# Patient Record
Sex: MALE | Race: WHITE | NOT HISPANIC OR LATINO | Employment: FULL TIME | URBAN - METROPOLITAN AREA
[De-identification: names, ages, dates, MRNs, and addresses within clinical notes are randomized per-mention and may not be internally consistent; named-entity substitution may affect disease eponyms.]

---

## 2024-04-10 ENCOUNTER — OFFICE VISIT (OUTPATIENT)
Dept: PRIMARY CARE | Facility: CLINIC | Age: 54
End: 2024-04-10
Payer: COMMERCIAL

## 2024-04-10 VITALS
BODY MASS INDEX: 28.59 KG/M2 | WEIGHT: 204.2 LBS | DIASTOLIC BLOOD PRESSURE: 92 MMHG | TEMPERATURE: 96.3 F | OXYGEN SATURATION: 98 % | SYSTOLIC BLOOD PRESSURE: 142 MMHG | HEART RATE: 87 BPM | HEIGHT: 71 IN

## 2024-04-10 DIAGNOSIS — M25.651 HIP JOINT STIFFNESS, BILATERAL: Primary | ICD-10-CM

## 2024-04-10 DIAGNOSIS — M25.652 HIP JOINT STIFFNESS, BILATERAL: Primary | ICD-10-CM

## 2024-04-10 PROCEDURE — 1036F TOBACCO NON-USER: CPT | Performed by: FAMILY MEDICINE

## 2024-04-10 PROCEDURE — 99214 OFFICE O/P EST MOD 30 MIN: CPT | Performed by: FAMILY MEDICINE

## 2024-04-10 RX ORDER — CYCLOBENZAPRINE HCL 5 MG
5 TABLET ORAL NIGHTLY PRN
Qty: 30 TABLET | Refills: 0 | Status: SHIPPED | OUTPATIENT
Start: 2024-04-10 | End: 2024-05-02 | Stop reason: WASHOUT

## 2024-04-10 RX ORDER — ATORVASTATIN CALCIUM 40 MG/1
40 TABLET, FILM COATED ORAL DAILY
COMMUNITY

## 2024-04-10 RX ORDER — FAMOTIDINE 20 MG/1
20 TABLET, FILM COATED ORAL DAILY
COMMUNITY

## 2024-04-10 RX ORDER — NABUMETONE 750 MG/1
750 TABLET, FILM COATED ORAL 2 TIMES DAILY
Qty: 60 TABLET | Refills: 1 | Status: SHIPPED | OUTPATIENT
Start: 2024-04-10 | End: 2024-05-02 | Stop reason: WASHOUT

## 2024-04-10 ASSESSMENT — LIFESTYLE VARIABLES
SKIP TO QUESTIONS 9-10: 1
HOW OFTEN DO YOU HAVE SIX OR MORE DRINKS ON ONE OCCASION: NEVER
HOW MANY STANDARD DRINKS CONTAINING ALCOHOL DO YOU HAVE ON A TYPICAL DAY: PATIENT DOES NOT DRINK
HOW OFTEN DO YOU HAVE A DRINK CONTAINING ALCOHOL: NEVER
AUDIT-C TOTAL SCORE: 0

## 2024-04-10 ASSESSMENT — PATIENT HEALTH QUESTIONNAIRE - PHQ9
1. LITTLE INTEREST OR PLEASURE IN DOING THINGS: NOT AT ALL
SUM OF ALL RESPONSES TO PHQ9 QUESTIONS 1 AND 2: 0
2. FEELING DOWN, DEPRESSED OR HOPELESS: NOT AT ALL

## 2024-04-10 ASSESSMENT — PAIN SCALES - GENERAL: PAINLEVEL: 8

## 2024-04-10 NOTE — PROGRESS NOTES
"History Of Present Illness  Anthony Espinoza is a 54 y.o. male presenting for Hip Pain (Pt c/o of hip/groin pain x 1.5 days. Pt states he can't walk without pain. Pt also states he has chronic back pain but it hasn't moved to his hips since yesterday. Pt states he didn't do anything to injury himself. )  .    HPI     A week and a half ago, back was bothering him. Low back was tight and sore.  No radiation. Went on vacation 1 week ago and was steady.   Went to work yesterday afternoon and hips are very stiff and sore.   Took Aleve and salonpas (lidocaine patch) with no relief.   Similar episode in 2020 and xray of hip was negative. Relieved with nabumetone.  No weakness, saddle paresthesia.   Sitting still relieves the pain.    No constipation, abd pain, nausea      Past Medical History  There are no problems to display for this patient.       Medications  Current Outpatient Medications on File Prior to Visit   Medication Sig    atorvastatin (Lipitor) 40 mg tablet Take 1 tablet (40 mg) by mouth once daily.    famotidine (Pepcid) 20 mg tablet Take 1 tablet (20 mg) by mouth once daily.     No current facility-administered medications on file prior to visit.        Surgical History  He has a past surgical history that includes Other surgical history (02/05/2018) and Appendectomy (02/05/2018).     Social History  He reports that he has never smoked. He has never been exposed to tobacco smoke. He has never used smokeless tobacco. He reports that he does not currently use alcohol. He reports that he does not use drugs.    Family History  No family history on file.     Allergies  Patient has no known allergies.    Immunizations  Immunization History   Administered Date(s) Administered    Moderna SARS-CoV-2 Vaccination 03/25/2021, 04/22/2021, 12/04/2021        ROS  Negative, except as discussed in HPI     Vitals  BP (!) 142/92   Pulse 87   Temp 35.7 °C (96.3 °F)   Ht 1.803 m (5' 11\")   Wt 92.6 kg (204 lb 3.2 oz)   SpO2 98%  "  BMI 28.48 kg/m²      Physical Exam  Vitals reviewed.   Constitutional:       Appearance: Normal appearance.   Abdominal:      General: Abdomen is flat.      Palpations: Abdomen is soft.      Tenderness: There is no abdominal tenderness.   Musculoskeletal:         General: No tenderness. Normal range of motion.   Neurological:      General: No focal deficit present.      Mental Status: He is alert.      Cranial Nerves: No cranial nerve deficit.         Relevant Results  Lab Results   Component Value Date    WBC 7.2 06/28/2023    WBC 7.2 05/23/2022    HGB 15.9 06/28/2023    HGB 15.6 05/23/2022    HCT 49.6 06/28/2023    HCT 47.8 05/23/2022    .6 (H) 06/28/2023    MCV 97.4 05/23/2022     06/28/2023     05/23/2022     Lab Results   Component Value Date     06/28/2023     05/23/2022    K 4.9 06/28/2023    K 4.5 05/23/2022     06/28/2023     05/23/2022    CO2 22 (L) 06/28/2023    CO2 25 05/23/2022    BUN 15 06/28/2023    BUN 13 05/23/2022    CREATININE 0.9 06/28/2023    CREATININE 0.8 05/23/2022    CALCIUM 9.4 06/28/2023    CALCIUM 9.3 05/23/2022    PROT 7.2 06/28/2023    PROT 7.5 05/23/2022    BILITOT 0.3 06/28/2023    BILITOT 0.4 05/23/2022    ALKPHOS 93 06/28/2023    ALKPHOS 108 05/23/2022    ALT 27 06/28/2023    ALT 25 05/23/2022    AST 24 06/28/2023    AST 25 05/23/2022    GLUCOSE 99 06/28/2023    GLUCOSE 101 (H) 05/23/2022     Lab Results   Component Value Date    HGBA1C 5.5 05/23/2022     Lab Results   Component Value Date    TSH 3.49 02/06/2018    FREET4 1.21 02/06/2018      Lab Results   Component Value Date    CHOL 131 (L) 06/28/2023    TRIG 95 06/28/2023    HDL 52 06/28/2023           Assessment/Plan   Anthony was seen today for hip pain.  Diagnoses and all orders for this visit:  Hip joint stiffness, bilateral (Primary)  -     currently not in pain until he is active. Declines toradol injection. Trial of NSAID and muscle relaxer. If no improvement, call back for  medrol dose pack.  -      nabumetone (Relafen) 750 mg tablet; Take 1 tablet (750 mg) by mouth 2 times a day.  -     cyclobenzaprine (Flexeril) 5 mg tablet; Take 1 tablet (5 mg) by mouth as needed at bedtime for muscle spasms.       There are no discontinued medications.     Counseling:   Medication education:   -Education:  The patient is counseled regarding potential side-effects of any and all new medications  -Understanding:  Patient expressed understanding of information discussed today  -Adherence:  No barriers to adherence identified    Final treatment plan is a result of shared decision making with patient.         Jitendra Aopnte MD

## 2024-04-10 NOTE — LETTER
April 10, 2024     Patient: Anthony Espinoza   YOB: 1970   Date of Visit: 4/10/2024       To Whom It May Concern:    Anthony Espinoza was seen in my clinic on 4/10/2024 at 2:15 pm. Please excuse Anthony for his absence from work on this day to make the appointment. He is cleared to return to work on Friday 4/12/2024 without restrictions.    If you have any questions or concerns, please don't hesitate to call.         Sincerely,         Jitendra Aponte MD        CC: No Recipients

## 2024-04-12 ENCOUNTER — PATIENT MESSAGE (OUTPATIENT)
Dept: PRIMARY CARE | Facility: CLINIC | Age: 54
End: 2024-04-12
Payer: COMMERCIAL

## 2024-04-17 ENCOUNTER — TELEPHONE (OUTPATIENT)
Dept: PRIMARY CARE | Facility: CLINIC | Age: 54
End: 2024-04-17
Payer: COMMERCIAL

## 2024-04-17 NOTE — TELEPHONE ENCOUNTER
PT CALLING TO LET MD KNOW THAT [facility] WILL BE CONTACTING OFFICE REGARDING SHORT TERM DISABILITY

## 2024-04-22 ENCOUNTER — TELEPHONE (OUTPATIENT)
Dept: PRIMARY CARE | Facility: CLINIC | Age: 54
End: 2024-04-22
Payer: COMMERCIAL

## 2024-05-02 ENCOUNTER — OFFICE VISIT (OUTPATIENT)
Dept: PRIMARY CARE | Facility: CLINIC | Age: 54
End: 2024-05-02
Payer: COMMERCIAL

## 2024-05-02 VITALS
TEMPERATURE: 98.2 F | HEIGHT: 71 IN | OXYGEN SATURATION: 97 % | WEIGHT: 204.8 LBS | SYSTOLIC BLOOD PRESSURE: 142 MMHG | HEART RATE: 99 BPM | DIASTOLIC BLOOD PRESSURE: 90 MMHG | BODY MASS INDEX: 28.67 KG/M2

## 2024-05-02 DIAGNOSIS — M54.50 ACUTE BILATERAL LOW BACK PAIN WITHOUT SCIATICA: Primary | ICD-10-CM

## 2024-05-02 PROCEDURE — 99214 OFFICE O/P EST MOD 30 MIN: CPT | Performed by: FAMILY MEDICINE

## 2024-05-02 PROCEDURE — 1036F TOBACCO NON-USER: CPT | Performed by: FAMILY MEDICINE

## 2024-05-02 RX ORDER — IBUPROFEN 800 MG/1
800 TABLET ORAL EVERY 8 HOURS PRN
Qty: 30 TABLET | Refills: 0 | Status: SHIPPED | OUTPATIENT
Start: 2024-05-02

## 2024-05-02 RX ORDER — METHYLPREDNISOLONE 4 MG/1
TABLET ORAL
Qty: 1 EACH | Refills: 0 | Status: SHIPPED | OUTPATIENT
Start: 2024-05-02

## 2024-05-02 ASSESSMENT — PATIENT HEALTH QUESTIONNAIRE - PHQ9
1. LITTLE INTEREST OR PLEASURE IN DOING THINGS: NOT AT ALL
2. FEELING DOWN, DEPRESSED OR HOPELESS: NOT AT ALL
SUM OF ALL RESPONSES TO PHQ9 QUESTIONS 1 AND 2: 0

## 2024-05-02 ASSESSMENT — PAIN SCALES - GENERAL: PAINLEVEL: 5

## 2024-05-02 NOTE — PROGRESS NOTES
"History Of Present Illness  Anthony Espinoza is a 54 y.o. male presenting for Medication Problem (Pt says the cyclobenzaprine he's on is causing him some issues and thinks he should be on the medication, \"steroid pack\" instead that MD discussed with him because he is not seeing any progress. He said the medication has helped since his last visit 3 weeks ago with stiffness. Has papers with him that need to be faxed over. No further concerns. )  .    HPI     Here to follow-up hip and back pain.  It started around early April while on vacation.  Had a flareup of his back pain as well as hip and groin pain.  He was prescribed nabumetone and Flexeril which has significantly improved his hip pain.  He reports persistent low back pain.  He still taking the muscle relaxer every evening.    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      Past Medical History  There are no problems to display for this patient.       Medications  Current Outpatient Medications on File Prior to Visit   Medication Sig    atorvastatin (Lipitor) 40 mg tablet Take 1 tablet (40 mg) by mouth once daily.    famotidine (Pepcid) 20 mg tablet Take 1 tablet (20 mg) by mouth once daily.    [DISCONTINUED] cyclobenzaprine (Flexeril) 5 mg tablet Take 1 tablet (5 mg) by mouth as needed at bedtime for muscle spasms.    [DISCONTINUED] nabumetone (Relafen) 750 mg tablet Take 1 tablet (750 mg) by mouth 2 times a day.     No current facility-administered medications on file prior to visit.        Surgical History  He has a past surgical history that includes Other surgical history (02/05/2018) and Appendectomy (02/05/2018).     Social History  He reports that he has never smoked. He has never been exposed to tobacco smoke. He has never used smokeless tobacco. He reports that he does not currently use alcohol. He reports that he does not use drugs.    Family History  No family history on file.     Allergies  Patient has no known " "allergies.    Immunizations  Immunization History   Administered Date(s) Administered    Moderna SARS-CoV-2 Vaccination 03/25/2021, 04/22/2021, 12/04/2021        ROS  Negative, except as discussed in HPI     Vitals  /90   Pulse 99   Temp 36.8 °C (98.2 °F)   Ht 1.803 m (5' 11\")   Wt 92.9 kg (204 lb 12.8 oz)   SpO2 97%   BMI 28.56 kg/m²      Physical Exam  Vitals and nursing note reviewed.   Constitutional:       General: He is not in acute distress.     Appearance: He is not toxic-appearing.   Pulmonary:      Effort: Pulmonary effort is normal.   Neurological:      General: No focal deficit present.      Mental Status: He is alert.   Psychiatric:         Mood and Affect: Mood normal.         Behavior: Behavior normal.         Thought Content: Thought content normal.         Judgment: Judgment normal.         Relevant Results  Lab Results   Component Value Date    WBC 7.2 06/28/2023    WBC 7.2 05/23/2022    HGB 15.9 06/28/2023    HGB 15.6 05/23/2022    HCT 49.6 06/28/2023    HCT 47.8 05/23/2022    .6 (H) 06/28/2023    MCV 97.4 05/23/2022     06/28/2023     05/23/2022     Lab Results   Component Value Date     06/28/2023     05/23/2022    K 4.9 06/28/2023    K 4.5 05/23/2022     06/28/2023     05/23/2022    CO2 22 (L) 06/28/2023    CO2 25 05/23/2022    BUN 15 06/28/2023    BUN 13 05/23/2022    CREATININE 0.9 06/28/2023    CREATININE 0.8 05/23/2022    CALCIUM 9.4 06/28/2023    CALCIUM 9.3 05/23/2022    PROT 7.2 06/28/2023    PROT 7.5 05/23/2022    BILITOT 0.3 06/28/2023    BILITOT 0.4 05/23/2022    ALKPHOS 93 06/28/2023    ALKPHOS 108 05/23/2022    ALT 27 06/28/2023    ALT 25 05/23/2022    AST 24 06/28/2023    AST 25 05/23/2022    GLUCOSE 99 06/28/2023    GLUCOSE 101 (H) 05/23/2022     Lab Results   Component Value Date    HGBA1C 5.5 05/23/2022     Lab Results   Component Value Date    TSH 3.49 02/06/2018    FREET4 1.21 02/06/2018      Lab Results   Component " Value Date    CHOL 131 (L) 06/28/2023    TRIG 95 06/28/2023    HDL 52 06/28/2023           Assessment/Plan   Anthony was seen today for medication problem.  Diagnoses and all orders for this visit:  Acute bilateral low back pain without sciatica (Primary)  -     methylPREDNISolone (Medrol Dospak) 4 mg tablets; Follow schedule on package instructions  -     ibuprofen 800 mg tablet; Take 1 tablet (800 mg) by mouth every 8 hours if needed for moderate pain (4 - 6).     Disability and FMLA paperwork completed.    Counseling:   Medication education:   -Education:  The patient is counseled regarding potential side-effects of any and all new medications  -Understanding:  Patient expressed understanding of information discussed today  -Adherence:  No barriers to adherence identified    Final treatment plan is a result of shared decision making with patient.         Jitendra Aponte MD

## 2024-05-10 ENCOUNTER — TELEPHONE (OUTPATIENT)
Dept: PRIMARY CARE | Facility: CLINIC | Age: 54
End: 2024-05-10
Payer: COMMERCIAL

## 2024-07-03 ENCOUNTER — APPOINTMENT (OUTPATIENT)
Dept: PRIMARY CARE | Facility: CLINIC | Age: 54
End: 2024-07-03
Payer: COMMERCIAL

## 2024-07-05 DIAGNOSIS — Z76.0 MEDICATION REFILL: Primary | ICD-10-CM

## 2024-07-08 RX ORDER — ATORVASTATIN CALCIUM 40 MG/1
40 TABLET, FILM COATED ORAL DAILY
Qty: 90 TABLET | Refills: 3 | Status: SHIPPED | OUTPATIENT
Start: 2024-07-08

## 2024-07-08 RX ORDER — FAMOTIDINE 20 MG/1
20 TABLET, FILM COATED ORAL DAILY
Qty: 90 TABLET | Refills: 3 | Status: SHIPPED | OUTPATIENT
Start: 2024-07-08

## 2024-08-01 ENCOUNTER — OFFICE VISIT (OUTPATIENT)
Dept: PRIMARY CARE | Facility: CLINIC | Age: 54
End: 2024-08-01
Payer: COMMERCIAL

## 2024-08-01 VITALS
TEMPERATURE: 98 F | SYSTOLIC BLOOD PRESSURE: 154 MMHG | BODY MASS INDEX: 27.66 KG/M2 | OXYGEN SATURATION: 95 % | HEART RATE: 90 BPM | WEIGHT: 197.6 LBS | HEIGHT: 71 IN | DIASTOLIC BLOOD PRESSURE: 80 MMHG

## 2024-08-01 DIAGNOSIS — K21.9 CHRONIC GERD: ICD-10-CM

## 2024-08-01 DIAGNOSIS — M54.50 CHRONIC BILATERAL LOW BACK PAIN WITHOUT SCIATICA: ICD-10-CM

## 2024-08-01 DIAGNOSIS — H61.21 IMPACTED CERUMEN OF RIGHT EAR: ICD-10-CM

## 2024-08-01 DIAGNOSIS — G89.29 CHRONIC BILATERAL LOW BACK PAIN WITHOUT SCIATICA: ICD-10-CM

## 2024-08-01 DIAGNOSIS — Z12.5 SCREENING PSA (PROSTATE SPECIFIC ANTIGEN): ICD-10-CM

## 2024-08-01 DIAGNOSIS — Z00.00 ANNUAL PHYSICAL EXAM: Primary | ICD-10-CM

## 2024-08-01 DIAGNOSIS — E78.5 HYPERLIPIDEMIA, UNSPECIFIED HYPERLIPIDEMIA TYPE: ICD-10-CM

## 2024-08-01 RX ORDER — FAMOTIDINE 40 MG/1
40 TABLET, FILM COATED ORAL DAILY
Qty: 90 TABLET | Refills: 3 | Status: SHIPPED | OUTPATIENT
Start: 2024-08-01 | End: 2025-08-01

## 2024-08-01 ASSESSMENT — LIFESTYLE VARIABLES
HOW OFTEN DO YOU HAVE SIX OR MORE DRINKS ON ONE OCCASION: NEVER
SKIP TO QUESTIONS 9-10: 1
HOW MANY STANDARD DRINKS CONTAINING ALCOHOL DO YOU HAVE ON A TYPICAL DAY: 1 OR 2
AUDIT-C TOTAL SCORE: 1
HOW OFTEN DO YOU HAVE A DRINK CONTAINING ALCOHOL: MONTHLY OR LESS

## 2024-08-01 ASSESSMENT — PAIN SCALES - GENERAL: PAINLEVEL: 3

## 2024-08-01 NOTE — PROGRESS NOTES
History Of Present Illness  Anthony Espinoza is a 54 y.o. male presenting for Annual Exam (Yearly physical exam.)  .    HPI     Health maintenance: He declines tetanus booster today.  Last colonoscopy was 4/2/2019 and surveillance is not due again until 2029.  He had a cardiac calcium score in 2018 with a total score of 87.85.    He has hyperlipidemia controlled with atorvastatin.  No myalgias    He has chronic low back pain without sciatica.  Had a recent flareup but it is improving, however not yet to his baseline of no pain.  He is still working full-time as a [employer] .  He is considering chiropractic treatment.    Has GERD.  He takes famotidine 20 mg once daily, but is having breakthrough symptoms.    Past Medical History  Patient Active Problem List    Diagnosis Date Noted    Hyperlipidemia 08/01/2024        Medications  Current Outpatient Medications   Medication Sig Dispense Refill    atorvastatin (Lipitor) 40 mg tablet take 1 tablet by mouth once daily 90 tablet 3    ibuprofen 800 mg tablet Take 1 tablet (800 mg) by mouth every 8 hours if needed for moderate pain (4 - 6). 30 tablet 0    famotidine (Pepcid) 40 mg tablet Take 1 tablet (40 mg) by mouth once daily. 90 tablet 3     No current facility-administered medications for this visit.        Surgical History  He has a past surgical history that includes Other surgical history (02/05/2018) and Appendectomy (02/05/2018).     Social History  He reports that he has never smoked. He has never been exposed to tobacco smoke. He has never used smokeless tobacco. He reports current alcohol use. He reports that he does not use drugs.    Family History  No family history on file.     Allergies  Patient has no known allergies.    Immunizations  Immunization History   Administered Date(s) Administered    Moderna SARS-CoV-2 Vaccination 03/25/2021, 04/22/2021, 12/04/2021        ROS  Negative, except as discussed in HPI     Vitals  /80   Pulse 90   Temp  "36.7 °C (98 °F)   Ht 1.803 m (5' 11\")   Wt 89.6 kg (197 lb 9.6 oz)   SpO2 95%   BMI 27.56 kg/m²      Physical Exam  Vitals and nursing note reviewed.   Constitutional:       Appearance: Normal appearance.   HENT:      Head: Normocephalic.      Right Ear: There is impacted cerumen (After successful irrigation TM is normal).      Left Ear: Tympanic membrane normal.      Nose: Nose normal.      Mouth/Throat:      Mouth: Mucous membranes are moist.   Eyes:      Extraocular Movements: Extraocular movements intact.      Conjunctiva/sclera: Conjunctivae normal.      Pupils: Pupils are equal, round, and reactive to light.   Cardiovascular:      Rate and Rhythm: Normal rate and regular rhythm.      Heart sounds: Normal heart sounds.   Pulmonary:      Effort: Pulmonary effort is normal. No respiratory distress.      Breath sounds: Normal breath sounds.   Abdominal:      General: Abdomen is flat.      Palpations: Abdomen is soft.      Tenderness: There is no abdominal tenderness.   Musculoskeletal:      Cervical back: Neck supple.   Lymphadenopathy:      Cervical: No cervical adenopathy.   Skin:     General: Skin is warm and dry.      Findings: No rash.   Neurological:      General: No focal deficit present.      Mental Status: He is alert. Mental status is at baseline.      Coordination: Coordination normal.      Gait: Gait normal.      Deep Tendon Reflexes: Reflexes normal.   Psychiatric:         Mood and Affect: Mood normal.         Behavior: Behavior normal.         Relevant Results  Lab Results   Component Value Date    WBC 8.5 08/03/2024    WBC 7.2 06/28/2023    HGB 14.9 08/03/2024    HGB 15.9 06/28/2023    HCT 44.9 08/03/2024    HCT 49.6 06/28/2023    MCV 96 08/03/2024    .6 (H) 06/28/2023     08/03/2024     06/28/2023     Lab Results   Component Value Date     08/03/2024     06/28/2023    K 4.8 08/03/2024    K 4.9 06/28/2023     08/03/2024     06/28/2023    CO2 24 " 08/03/2024    CO2 22 (L) 06/28/2023    BUN 17 08/03/2024    BUN 15 06/28/2023    CREATININE 0.90 08/03/2024    CREATININE 0.9 06/28/2023    CALCIUM 9.3 08/03/2024    CALCIUM 9.4 06/28/2023    PROT 6.9 08/03/2024    PROT 7.2 06/28/2023    BILITOT 0.5 08/03/2024    BILITOT 0.3 06/28/2023    ALKPHOS 129 (H) 08/03/2024    ALKPHOS 93 06/28/2023    ALT 23 08/03/2024    ALT 27 06/28/2023    AST 22 08/03/2024    AST 24 06/28/2023    GLUCOSE 96 08/03/2024    GLUCOSE 99 06/28/2023     Lab Results   Component Value Date    HGBA1C 5.7 (H) 08/03/2024     Lab Results   Component Value Date    TSH 3.39 08/03/2024    FREET4 1.21 02/06/2018      Lab Results   Component Value Date    CHOL 110 (L) 08/03/2024    TRIG 59 08/03/2024    HDL 47.0 08/03/2024           Assessment/Plan   Anthony was seen today for annual exam.  Diagnoses and all orders for this visit:  Annual physical exam (Primary)  -     Comprehensive Metabolic Panel; Future  -     Lipid Panel; Future  -     TSH with reflex to Free T4 if abnormal; Future  -     CBC; Future  -     Hemoglobin A1C; Future  -     Prostate Specific Antigen; Future  Screening PSA (prostate specific antigen)  -     Prostate Specific Antigen; Future  Chronic bilateral low back pain without sciatica  -     Referral to Physical Therapy; Future  Hyperlipidemia, unspecified hyperlipidemia type  -     CT cardiac scoring wo IV contrast; Future  Chronic GERD  -     famotidine (Pepcid) 40 mg tablet; Take 1 tablet (40 mg) by mouth once daily.  Impacted cerumen of right ear  -     Ear Cerumen Removal; Future         Counseling:   Medication education:   -Education:  The patient is counseled regarding potential side-effects of any and all new medications  -Understanding:  Patient expressed understanding of information discussed today  -Adherence:  No barriers to adherence identified    Final treatment plan is a result of shared decision making with patient.         Jitendra Aponte MD

## 2024-08-01 NOTE — PATIENT INSTRUCTIONS
-Get labs done fasting  -Call to schedule CT cardiac score  -We increased famotidine from 20mg to 40mg daily for acid reflux

## 2024-08-03 ENCOUNTER — LAB (OUTPATIENT)
Dept: LAB | Facility: LAB | Age: 54
End: 2024-08-03
Payer: COMMERCIAL

## 2024-08-03 DIAGNOSIS — Z12.5 SCREENING PSA (PROSTATE SPECIFIC ANTIGEN): ICD-10-CM

## 2024-08-03 DIAGNOSIS — Z00.00 ANNUAL PHYSICAL EXAM: ICD-10-CM

## 2024-08-03 LAB
ALBUMIN SERPL-MCNC: 4.1 G/DL (ref 3.5–5)
ALP BLD-CCNC: 129 U/L (ref 35–125)
ALT SERPL-CCNC: 23 U/L (ref 5–40)
ANION GAP SERPL CALC-SCNC: 9 MMOL/L
AST SERPL-CCNC: 22 U/L (ref 5–40)
BILIRUB SERPL-MCNC: 0.5 MG/DL (ref 0.1–1.2)
BUN SERPL-MCNC: 17 MG/DL (ref 8–25)
CALCIUM SERPL-MCNC: 9.3 MG/DL (ref 8.5–10.4)
CHLORIDE SERPL-SCNC: 107 MMOL/L (ref 97–107)
CHOLEST SERPL-MCNC: 110 MG/DL (ref 133–200)
CHOLEST/HDLC SERPL: 2.3 {RATIO}
CO2 SERPL-SCNC: 24 MMOL/L (ref 24–31)
CREAT SERPL-MCNC: 0.9 MG/DL (ref 0.4–1.6)
EGFRCR SERPLBLD CKD-EPI 2021: >90 ML/MIN/1.73M*2
ERYTHROCYTE [DISTWIDTH] IN BLOOD BY AUTOMATED COUNT: 12 % (ref 11.5–14.5)
EST. AVERAGE GLUCOSE BLD GHB EST-MCNC: 117 MG/DL
GLUCOSE SERPL-MCNC: 96 MG/DL (ref 65–99)
HBA1C MFR BLD: 5.7 %
HCT VFR BLD AUTO: 44.9 % (ref 41–52)
HDLC SERPL-MCNC: 47 MG/DL
HGB BLD-MCNC: 14.9 G/DL (ref 13.5–17.5)
LDLC SERPL CALC-MCNC: 51 MG/DL (ref 65–130)
MCH RBC QN AUTO: 31.7 PG (ref 26–34)
MCHC RBC AUTO-ENTMCNC: 33.2 G/DL (ref 32–36)
MCV RBC AUTO: 96 FL (ref 80–100)
NRBC BLD-RTO: 0 /100 WBCS (ref 0–0)
PLATELET # BLD AUTO: 252 X10*3/UL (ref 150–450)
POTASSIUM SERPL-SCNC: 4.8 MMOL/L (ref 3.4–5.1)
PROT SERPL-MCNC: 6.9 G/DL (ref 5.9–7.9)
PSA SERPL-MCNC: 0.5 NG/ML
RBC # BLD AUTO: 4.7 X10*6/UL (ref 4.5–5.9)
SODIUM SERPL-SCNC: 140 MMOL/L (ref 133–145)
TRIGL SERPL-MCNC: 59 MG/DL (ref 40–150)
TSH SERPL DL<=0.05 MIU/L-ACNC: 3.39 MIU/L (ref 0.27–4.2)
WBC # BLD AUTO: 8.5 X10*3/UL (ref 4.4–11.3)

## 2024-08-03 PROCEDURE — 80061 LIPID PANEL: CPT

## 2024-08-03 PROCEDURE — 85027 COMPLETE CBC AUTOMATED: CPT

## 2024-08-03 PROCEDURE — 83036 HEMOGLOBIN GLYCOSYLATED A1C: CPT

## 2024-08-03 PROCEDURE — 84443 ASSAY THYROID STIM HORMONE: CPT

## 2024-08-03 PROCEDURE — 80053 COMPREHEN METABOLIC PANEL: CPT

## 2024-08-03 PROCEDURE — 84153 ASSAY OF PSA TOTAL: CPT

## 2024-08-03 PROCEDURE — 36415 COLL VENOUS BLD VENIPUNCTURE: CPT

## 2024-08-27 ENCOUNTER — EVALUATION (OUTPATIENT)
Dept: PHYSICAL THERAPY | Facility: CLINIC | Age: 54
End: 2024-08-27
Payer: COMMERCIAL

## 2024-08-27 DIAGNOSIS — G89.29 CHRONIC BILATERAL LOW BACK PAIN WITHOUT SCIATICA: ICD-10-CM

## 2024-08-27 DIAGNOSIS — M54.50 CHRONIC BILATERAL LOW BACK PAIN WITHOUT SCIATICA: ICD-10-CM

## 2024-08-27 PROCEDURE — 97161 PT EVAL LOW COMPLEX 20 MIN: CPT | Mod: GP | Performed by: PHYSICAL THERAPIST

## 2024-08-27 PROCEDURE — 97140 MANUAL THERAPY 1/> REGIONS: CPT | Mod: GP | Performed by: PHYSICAL THERAPIST

## 2024-08-27 ASSESSMENT — PAIN - FUNCTIONAL ASSESSMENT: PAIN_FUNCTIONAL_ASSESSMENT: 0-10

## 2024-08-27 ASSESSMENT — PAIN SCALES - GENERAL: PAINLEVEL_OUTOF10: 4

## 2024-08-27 NOTE — PROGRESS NOTES
Physical Therapy Evaluation and Treatment      Patient Name: Anthony Espinoza  MRN: [id number]  Today's Date: 8/27/2024  Time Calculation  Start Time: 0728  Stop Time: 0807  Time Calculation (min): 39 min  PT Therapeutic Procedures Time Entry  Manual Therapy Time Entry: 14  Low complexity due to patient's clinical presentation being stable and uncomplicated by any significant comorbidities that may affect rehab tolerance and progression.    Insurance:  Visit number: 1 of 7  Authorization info: NO AUTH / [place] VISITS / DEDUCT $100 - $0 met / 80% COVERAGE / OOP $1000 - $0 met   Insurance Type: Payor: [employer] / Plan: ANTHEM [employer] / Product Type: *No Product type* /     Current Problem:   1. Chronic bilateral low back pain without sciatica  Referral to Physical Therapy    Follow Up In Physical Therapy          Subjective    General:  General  Reason for Referral: LBP  Referred By: Dr. Aponte  General Comment: Pt reports his back started to have discomfort in the beginning of the year around March. He eventually went to MD who did give him medications but none of it helped. 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 He was off work for 5-6 weeks. During that time he started to do stretches that he found online which really helped a lot. He reprots that he is very stiff in the morning and during the work day he will get L sided glut region pain and stiffness. He has also started to notice thoracic back discomfort. Overall he feels that he is doing a lot better. He thought about going to chiropractor but his MD suggests coming to PT. He will be retiring next April and would like to continue working until than. He denies numbness/tingling. He feels that if he bends over slowly the back will loosen up. He has not had any imaging yet.  Precautions:  Precautions  Precautions Comment: None  Pain:  Pain Assessment  Pain Assessment: 0-10  0-10 (Numeric) Pain Score: 4  Home Living:   Works as [employer]  , heavy lifting   Prior Level of Function:  Prior Function Per Pt/Caregiver Report  Level of [place]: Independent with ADLs and functional transfers, Independent with homemaking with ambulation    Objective     Functional Assessments:  Gait Comment: grossly normalized noted initial asymmetry of pelvis with R scapular elevation prior to manual treatment; post treatment noted improvement with pelvic stabiltiy and alignment of scapula    Lumbar Spine    Observation  Thoracic: Noted increased kyphosis on R compared to L; R scapula elevated  Pelvis: Asymmetrical with L ASIS anterior rotation; hypomobility of L PSIS/sacral region  Lumbar Palpation/Joint Mobility Assessment  Palpation/Joint Mobility Comment: Increased tension of L piriformis and glut; no palpable tenderness  Lumbar AROM  Lumbar AROM WFL: yes  Lumbar extension (25°):  (Grossly limited in extension)  Hip AROM  Hip AROM WFL: yes    Specific Lower Extremity MMT  Specific Lower Extremity MMT WFL: yes (Functional strength did not formally assess MMT this date due to time constraint will assess as appropriate and able)    Special Tests  Other: Forward flexion test positive with reduced L pelvic rotation     Outcome Measures:  Other Measures  Oswestry Disablity Index (BRIAN): 10     Treatments:  Therapeutic Exercise  Therapeutic Exercise Performed: Yes  Therapeutic Exercise Activity 1: Access Code [email or web address]  - Supine SI Joint Self-Correction  - 1 x daily - 7 x weekly - 1 sets - 3 reps - 5-10 seconds hold  - Supine Bridge  - 1 x daily - 7 x weekly - 3 sets - 10 reps  - Supine Hip Adduction Isometric with Ball and Ab Brace  - 1 x daily - 7 x weekly - 3 sets - 10 reps  - Seated Figure 4 Piriformis Stretch  - 1 x daily - 7 x weekly - 1 sets - 3 reps - 30 seconds hold  Manual Therapy  Manual Therapy Performed: Yes  Manual Therapy Activity 1: SI mob grade 1 central and unilateral B  Manual Therapy Activity 2: Leg distraction  Manual Therapy Activity 3: Anterior  pelvic mobs along ASIS/iliac crest      Assessment   Assessment:  PT Assessment Results: Decreased strength, Decreased range of motion, Pain  Rehab Prognosis: Good  Assessment Comment: Pt is a 54 y.o male presenting to therapy with LBP. Pt found to have significant leg length discrepency with hypomobility of L sacrum during mobs and forward flexion test. Tension noted along L glut musculature however no reported tenderness however this may be creating increased tension along sacrum and reduced mobility. Pt demonstrated functional strength however did not perform formal MMT at this time due to time constraint despite this the observed leg length is suggestive of reduced pelvic stability. At this time pt would benefit from skilled physical therapy in order to prevent further functional decline and return to full work status.    Plan:  Treatment/Interventions: Dry needling, Education/ Instruction, Gait training, Manual therapy, Cryotherapy, Hot pack, Mechanical traction, Neuromuscular re-education, Taping techniques, Therapeutic activities, Therapeutic exercises  PT Plan: Skilled PT  PT Frequency: 1 time per week  Duration: 8 visits  Onset Date: 08/27/24  Certification Period Start Date: 08/27/24  Certification Period End Date: 11/25/24  Number of Treatments Authorized: MN  Rehab Potential: Good  Plan of Care Agreement: Patient    OP EDUCATION:  Outpatient Education  Individual(s) Educated: Patient  Education Provided: Anatomy, Home Exercise Program, POC  Risk and Benefits Discussed with Patient/Caregiver/Other: yes  Patient/Caregiver Demonstrated Understanding: yes  Plan of Care Discussed and Agreed Upon: yes  Patient Response to Education: Patient/Caregiver Verbalized Understanding of Information, Patient/Caregiver Performed Return Demonstration of Exercises/Activities, Patient/Caregiver Asked Appropriate Questions    Goals:  Active       PT Problem       PT Goal 1       Start:  08/27/24    Expected End:  10/26/24        Pt will be 100% IND with HEP in 6 weeks in order to maintain progress with therapy.           PT Goal 2       Start:  08/27/24    Expected End:  10/26/24       Pt will demonstrate equal leg length in 6 weeks for function lumbopelvic stability to prevent reduce work tasks.          PT Goal 3       Start:  08/27/24    Expected End:  10/26/24       Pt will demonstrate subjective improvement of ADLs and recreational activities through improved score of 0 on BRIAN in 6 weeks for return to full work tasks.          PT Goal 4       Start:  08/27/24    Expected End:  10/26/24       Pt will demonstrate good body mechanics during lift tasks in 6 weeks to prevent reinjury during work tasks.

## 2024-09-17 ENCOUNTER — TREATMENT (OUTPATIENT)
Dept: PHYSICAL THERAPY | Facility: CLINIC | Age: 54
End: 2024-09-17
Payer: COMMERCIAL

## 2024-09-17 DIAGNOSIS — G89.29 CHRONIC BILATERAL LOW BACK PAIN WITHOUT SCIATICA: ICD-10-CM

## 2024-09-17 DIAGNOSIS — M54.50 CHRONIC BILATERAL LOW BACK PAIN WITHOUT SCIATICA: ICD-10-CM

## 2024-09-17 PROCEDURE — 97110 THERAPEUTIC EXERCISES: CPT | Mod: GP,CQ

## 2024-09-17 PROCEDURE — 97140 MANUAL THERAPY 1/> REGIONS: CPT | Mod: GP,CQ

## 2024-09-17 ASSESSMENT — PAIN - FUNCTIONAL ASSESSMENT: PAIN_FUNCTIONAL_ASSESSMENT: 0-10

## 2024-09-17 ASSESSMENT — PAIN SCALES - GENERAL: PAINLEVEL_OUTOF10: 2

## 2024-09-17 NOTE — PROGRESS NOTES
"Physical Therapy Treatment    Patient Name: Anthony Espinoza  MRN: [id number]  Today's Date: 9/17/2024  Time Calculation  Start Time: 0745  Stop Time: 0826  Time Calculation (min): 41 min  PT Therapeutic Procedures Time Entry  Manual Therapy Time Entry: 16  Therapeutic Exercise Time Entry: 25    Insurance:  Visit number: 2 of 7  Authorization info: [employer] TE[employer] - NO AUTH / [place] V[place]  Insurance Type: Payor: GORDO / Plan: [place] [employer] [place] / Product Type: *No Product type* /     Current Problem   1. Chronic bilateral low back pain without sciatica  Follow Up In Physical Therapy          Subjective   General    Patient reports minimal pain today, states he believes the positions he sleeps in have an impact on his pain. States he tries to sleep on his side but sometimes \"wakes up in weird positions\". States he has been diligent with given HEP as well as his own stretches. Notices his back pain does feel better. Does notice that his back hurts more with prolonged standing for >5'. Sometimes gets pain bending over, lifting, and twisting.    Precautions:   none    Pain   Pain Assessment: 0-10  0-10 (Numeric) Pain Score: 2    Post Treatment Pain Level   No change    Objective   Pelvic symmetry: rotated anteriorly on R side with R side elevation and leg length discrepancy, R SI hypermobility, L hypomobility with tenderness to palpation. R psoas tenderness/tightness.     SLS test: (+) LLE    SLR test: (+) LLE    Treatments:  Therapeutic Exercise:  Therapeutic Exercise  Therapeutic Exercise Performed: Yes  Therapeutic Exercise Activity 1: recumbent bike 5' warm up  Therapeutic Exercise Activity 2: supine SI self correction alt with fang breathing x15 B  Therapeutic Exercise Activity 3: ISO ADD bridge with fang breathing x 10  Therapeutic Exercise Activity 4: ISO ABD bridge fang breathing x10    Manual:  Manual Therapy  Manual Therapy Performed: Yes  Manual Therapy Activity 1: ASIS MET R  Manual Therapy Activity 2: PSIS MET L  Manual " Therapy Activity 3: Thoracic joint mobs  Manual Therapy Activity 4: TPR to lumbosacral paraspinals, multifidus, B piriformis      Assessment   Assessment:    Tests indicating pelvic dysfunction and instability. Asymmetry observed this date but able to correct with manual techniques, good pelvic alignment at end of session. Weakness and instability observed on L through glutes and pelvic stabilizers. Also showing evidence of tightness through thoracic spine and weakness through core and glutes.     Plan:    Progress pelvic and core stability, hip and scap strengthening    OP EDUCATION:   Review of HEP    Goals:   Active       PT Problem       PT Goal 1       Start:  08/27/24    Expected End:  10/26/24       Pt will be 100% IND with HEP in 6 weeks in order to maintain progress with therapy.           PT Goal 2       Start:  08/27/24    Expected End:  10/26/24       Pt will demonstrate equal leg length in 6 weeks for function lumbopelvic stability to prevent reduce work tasks.          PT Goal 3       Start:  08/27/24    Expected End:  10/26/24       Pt will demonstrate subjective improvement of ADLs and recreational activities through improved score of 0 on BRINA in 6 weeks for return to full work tasks.          PT Goal 4       Start:  08/27/24    Expected End:  10/26/24       Pt will demonstrate good body mechanics during lift tasks in 6 weeks to prevent reinjury during work tasks.

## 2024-09-24 ENCOUNTER — TREATMENT (OUTPATIENT)
Dept: PHYSICAL THERAPY | Facility: CLINIC | Age: 54
End: 2024-09-24
Payer: COMMERCIAL

## 2024-09-24 DIAGNOSIS — G89.29 CHRONIC BILATERAL LOW BACK PAIN WITHOUT SCIATICA: ICD-10-CM

## 2024-09-24 DIAGNOSIS — M54.50 CHRONIC BILATERAL LOW BACK PAIN WITHOUT SCIATICA: ICD-10-CM

## 2024-09-24 PROCEDURE — 97110 THERAPEUTIC EXERCISES: CPT | Mod: GP | Performed by: PHYSICAL THERAPIST

## 2024-09-24 ASSESSMENT — PAIN - FUNCTIONAL ASSESSMENT: PAIN_FUNCTIONAL_ASSESSMENT: 0-10

## 2024-09-24 ASSESSMENT — PAIN SCALES - GENERAL: PAINLEVEL_OUTOF10: 4

## 2024-09-24 NOTE — PROGRESS NOTES
"Physical Therapy Treatment    Patient Name: Anthony Espinoza  MRN: [id number]  Today's Date: 9/24/2024  Time Calculation  Start Time: 0730  Stop Time: 0810  Time Calculation (min): 40 min  PT Therapeutic Procedures Time Entry  Therapeutic Exercise Time Entry: 40    Insurance:  Visit number: 3 of 7  Authorization info: NO AUTH / [place] VISITS / DEDUCT $100 - $0 met / 80% COVERAGE / OOP $1000   Insurance Type: Payor: ANTHEM / Plan: ANTHEM [employer] [place] / Product Type: *No Product type* /     Current Problem   1. Chronic bilateral low back pain without sciatica  Follow Up In Physical Therapy          Subjective   General   Reason for Referral: LBP  Referred By: Dr. Aponte  General Comment: Pt felt like he was doing really well last week with reduced symptoms however over the weekend he had a flare up for no known reason. He reports L sided soreness this date.  Precautions:  Precautions  Precautions Comment: None  Pain   Pain Assessment: 0-10  0-10 (Numeric) Pain Score: 4  Post Treatment Pain Level 3    Objective   Continues to have pelvic asymmetry  with L anterior rotation   Tenderness throughout L psoas, lumbar paraspinals and glut regions   L hip flexor compensation during s/l abd     Treatments:  Therapeutic Exercise:  Therapeutic Exercise  Therapeutic Exercise Performed: Yes  Therapeutic Exercise Activity 1: recumbent bike 5' warm up  Therapeutic Exercise Activity 2: SI correction 5\" H 5x B  Therapeutic Exercise Activity 3: s/l hip abd 10x3 B  Therapeutic Exercise Activity 4: s/l clamshell light green 10x3 B  Therapeutic Exercise Activity 5: resisted bridge 10x3  Therapeutic Exercise Activity 6: standing hip flexor stretch 30\"x3 (reported slight incresae in symptoms post)  Therapeutic Exercise Activity 7: seated SB stretch with lateral 10x       Assessment   Assessment:   PT Assessment  Assessment Comment: Pt continues to demonstrate unequal pelvic alignment this date. Significant weakness noted throughout L glut " stabilizers and compensation/over activation of L psoas. Exercise focus on glut strengthening to reduce psoas involvement, attempted psoas stretch however tenderness increase indicating irritation within this muscle. Will continue to focus on glut and core strengthening to improve balance within muscles.    Plan:    Potentially utilize dry needling this soreness persists - focus on hip flexor strengthening as well     OP EDUCATION:   Updated HEP    Goals:   Active       PT Problem       PT Goal 1       Start:  08/27/24    Expected End:  10/26/24       Pt will be 100% IND with HEP in 6 weeks in order to maintain progress with therapy.           PT Goal 2       Start:  08/27/24    Expected End:  10/26/24       Pt will demonstrate equal leg length in 6 weeks for function lumbopelvic stability to prevent reduce work tasks.          PT Goal 3       Start:  08/27/24    Expected End:  10/26/24       Pt will demonstrate subjective improvement of ADLs and recreational activities through improved score of 0 on BRIAN in 6 weeks for return to full work tasks.          PT Goal 4       Start:  08/27/24    Expected End:  10/26/24       Pt will demonstrate good body mechanics during lift tasks in 6 weeks to prevent reinjury during work tasks.

## 2024-10-01 ENCOUNTER — APPOINTMENT (OUTPATIENT)
Dept: PHYSICAL THERAPY | Facility: CLINIC | Age: 54
End: 2024-10-01
Payer: COMMERCIAL

## 2024-10-08 ENCOUNTER — TREATMENT (OUTPATIENT)
Dept: PHYSICAL THERAPY | Facility: CLINIC | Age: 54
End: 2024-10-08
Payer: COMMERCIAL

## 2024-10-08 DIAGNOSIS — G89.29 CHRONIC BILATERAL LOW BACK PAIN WITHOUT SCIATICA: ICD-10-CM

## 2024-10-08 DIAGNOSIS — M54.50 CHRONIC BILATERAL LOW BACK PAIN WITHOUT SCIATICA: ICD-10-CM

## 2024-10-08 PROCEDURE — 97140 MANUAL THERAPY 1/> REGIONS: CPT | Mod: GP,CQ

## 2024-10-08 PROCEDURE — 97110 THERAPEUTIC EXERCISES: CPT | Mod: GP,CQ

## 2024-10-08 ASSESSMENT — PAIN SCALES - GENERAL: PAINLEVEL_OUTOF10: 2

## 2024-10-08 ASSESSMENT — PAIN - FUNCTIONAL ASSESSMENT: PAIN_FUNCTIONAL_ASSESSMENT: 0-10

## 2024-10-08 NOTE — PROGRESS NOTES
"Physical Therapy Treatment    Patient Name: Anthony Espinoza  MRN: [id number]  Today's Date: 10/8/2024  Time Calculation  Start Time: 0745  Stop Time: 0826  Time Calculation (min): 41 min  PT Therapeutic Procedures Time Entry  Manual Therapy Time Entry: 25  Therapeutic Exercise Time Entry: 16    Insurance:  Visit number: 4 of 7  Authorization info: NO AUTH / [place] VISITS / DEDUCT $100 - $0 met / 80% COVERAGE / OOP $1000   Insurance Type: Payor: GORDO / Plan: GORDO [facility] / Product Type: *No Product type* /     Current Problem   1. Chronic bilateral low back pain without sciatica  Follow Up In Physical Therapy          Subjective   General    Patient reports no significant changes since starting at therapy, states that his pain differs from day to day but notices the worst discomfort on L side in static standing. Reports that if he's moving it is manageable but if he stands statically for longer then 2 min it worsens. Reports \"not seeing a huge difference with exercises\".     Precautions:   None     Pain   Pain Assessment: 0-10  0-10 (Numeric) Pain Score: 2  Pain Location: Back  Pain Orientation: Left  Pain Radiating Towards: pelvis    Post Treatment Pain Level   2/10    Objective   L TTP areas to lumbosacral paraspinals, multifidus, and glutes    Treatments:  Therapeutic Exercise:  Therapeutic Exercise  Therapeutic Exercise Performed: Yes  Therapeutic Exercise Activity 1: recumbent bike 6' warm up  Therapeutic Exercise Activity 2: LTR with red SB with fang breathing  Therapeutic Exercise Activity 3: figure 4 stretch B 3 x 45\"  Therapeutic Exercise Activity 4: supine butterfly stretch 3 x 45\" H  Therapeutic Exercise Activity 5: ISO hip ADD bridge with fang breathing 10x2    Manual:  Manual Therapy  Manual Therapy Performed: Yes  Manual Therapy Activity 1: ASIS MET L posteriorly  Manual Therapy Activity 2: MET to L hip flexor  Manual Therapy Activity 3: dry needling  IDN performed this date. Pt educated on risks and " benefits of dry needling. Pt provided verbal consent. All universal precautions followed.    1 - 40 mm L5-2 paraspinals B    No adverse response. All IDN guidelines and safety protocols followed.  Performed by Caroline Hackett PT DPT    Assessment   Assessment:    Focus and emphasis on pain relief and pelvic/core strength and stability. Good toleration to all interventions with slight relief of pain symptoms. Tightness to above stated structures as well as L hip flexor and B hip adductors/piriformis. Weakness observed to glutes, will progress strength and stability type activities next session as able.     Plan:    See above    OP EDUCATION:   Figure 4 and butterfly stretch added to HEP     Goals:   Active       PT Problem       PT Goal 1       Start:  08/27/24    Expected End:  10/26/24       Pt will be 100% IND with HEP in 6 weeks in order to maintain progress with therapy.           PT Goal 2       Start:  08/27/24    Expected End:  10/26/24       Pt will demonstrate equal leg length in 6 weeks for function lumbopelvic stability to prevent reduce work tasks.          PT Goal 3       Start:  08/27/24    Expected End:  10/26/24       Pt will demonstrate subjective improvement of ADLs and recreational activities through improved score of 0 on BRIAN in 6 weeks for return to full work tasks.          PT Goal 4       Start:  08/27/24    Expected End:  10/26/24       Pt will demonstrate good body mechanics during lift tasks in 6 weeks to prevent reinjury during work tasks.

## 2024-10-15 ENCOUNTER — APPOINTMENT (OUTPATIENT)
Dept: PHYSICAL THERAPY | Facility: CLINIC | Age: 54
End: 2024-10-15
Payer: COMMERCIAL

## 2024-10-16 ENCOUNTER — APPOINTMENT (OUTPATIENT)
Dept: PHYSICAL THERAPY | Facility: CLINIC | Age: 54
End: 2024-10-16
Payer: COMMERCIAL

## 2024-10-16 DIAGNOSIS — G89.29 CHRONIC BILATERAL LOW BACK PAIN WITHOUT SCIATICA: ICD-10-CM

## 2024-10-16 DIAGNOSIS — M54.50 CHRONIC BILATERAL LOW BACK PAIN WITHOUT SCIATICA: ICD-10-CM

## 2024-10-16 PROCEDURE — 97110 THERAPEUTIC EXERCISES: CPT | Mod: GP | Performed by: PHYSICAL THERAPIST

## 2024-10-16 ASSESSMENT — PAIN SCALES - GENERAL: PAINLEVEL_OUTOF10: 1

## 2024-10-16 ASSESSMENT — PAIN - FUNCTIONAL ASSESSMENT: PAIN_FUNCTIONAL_ASSESSMENT: 0-10

## 2024-10-16 NOTE — PROGRESS NOTES
"Physical Therapy Treatment    Patient Name: Anthony Espinoza  MRN: [id number]  Today's Date: 10/16/2024  Time Calculation  Start Time: 1145  Stop Time: 1223  Time Calculation (min): 38 min  PT Therapeutic Procedures Time Entry  Therapeutic Exercise Time Entry: 38    Insurance:  Visit number: 5 of 7  Authorization info: NO AUTH / [place] VISITS / DEDUCT $100 - $0 met / 80% COVERAGE / OOP $1000   Insurance Type: Payor: ANTHEM / Plan: ANTHEM [employer] [place] / Product Type: *No Product type* /     Current Problem   1. Chronic bilateral low back pain without sciatica  Follow Up In Physical Therapy          Subjective   General   Reason for Referral: LBP  Referred By: Dr. Aponte  General Comment: Pt reports slight imrpovement he has started doing a standing forward flexion and figure 4 stretch which does help relieve the discomfort but it will continue to tighten throughout his work day.  Precautions:  Precautions  Precautions Comment: None  Pain   Pain Assessment: 0-10  0-10 (Numeric) Pain Score: 1  Post Treatment Pain Level 1    Objective   L QL tightness     Treatments:  Therapeutic Exercise:  Therapeutic Exercise  Therapeutic Exercise Performed: Yes  Therapeutic Exercise Activity 1: recumbent bike 6' warm up  Therapeutic Exercise Activity 2: standing QL stretch 30\"  Therapeutic Exercise Activity 3: standing palloff press light blue TB 10x; mint green TB 10x2  Therapeutic Exercise Activity 4: standing small lumbar rotation with mint green TB 10x3  Therapeutic Exercise Activity 5: side planks 10\"x2      Assessment   Assessment:   PT Assessment  Assessment Comment: Pt presents with deficits along QL and oblique creating endurance limitation during work tasks creating tightness patterns. updated HEP with focus on core exercises this date with pt reporting bruise type feeling along QL region. Will perform reassessment next session.    Plan:    Reassess next session     OP EDUCATION:   Educated on exercise form and anatomy     Goals: "   Active       PT Problem       PT Goal 1       Start:  08/27/24    Expected End:  10/26/24       Pt will be 100% IND with HEP in 6 weeks in order to maintain progress with therapy.           PT Goal 2       Start:  08/27/24    Expected End:  10/26/24       Pt will demonstrate equal leg length in 6 weeks for function lumbopelvic stability to prevent reduce work tasks.          PT Goal 3       Start:  08/27/24    Expected End:  10/26/24       Pt will demonstrate subjective improvement of ADLs and recreational activities through improved score of 0 on BRIAN in 6 weeks for return to full work tasks.          PT Goal 4       Start:  08/27/24    Expected End:  10/26/24       Pt will demonstrate good body mechanics during lift tasks in 6 weeks to prevent reinjury during work tasks.

## 2024-10-22 ENCOUNTER — APPOINTMENT (OUTPATIENT)
Dept: PHYSICAL THERAPY | Facility: CLINIC | Age: 54
End: 2024-10-22
Payer: COMMERCIAL

## 2024-11-08 ENCOUNTER — APPOINTMENT (OUTPATIENT)
Dept: PHYSICAL THERAPY | Facility: CLINIC | Age: 54
End: 2024-11-08
Payer: COMMERCIAL

## 2024-12-05 ENCOUNTER — DOCUMENTATION (OUTPATIENT)
Dept: PHYSICAL THERAPY | Facility: CLINIC | Age: 54
End: 2024-12-05
Payer: COMMERCIAL

## 2024-12-05 NOTE — PROGRESS NOTES
"Physical Therapy    Discharge Summary    Name: Anthony Espinoza  MRN: [id number]  : 1970  Date: 24    Discharge Summary: PT    Discharge Information: Date of discharge 24, Date of last visit 10/16/24, and Date of evaluation 24    Therapy Summary: At last session \"Pt presents with deficits along QL and oblique creating endurance limitation during work tasks creating tightness patterns. updated HEP with focus on core exercises this date with pt reporting bruise type feeling along QL region.\" However pt did not return for follow up session due to conflicts in schedule, chart was held for 30 days without return.    Discharge Status: goals partially met     Rehab Discharge Reason: Achieved all and/or the most significant goals(s) and Failed to schedule and/or keep follow-up appointment(s)  "

## 2025-01-10 DIAGNOSIS — K21.9 CHRONIC GERD: ICD-10-CM

## 2025-01-10 DIAGNOSIS — Z76.0 MEDICATION REFILL: ICD-10-CM

## 2025-01-13 RX ORDER — ATORVASTATIN CALCIUM 40 MG/1
40 TABLET, FILM COATED ORAL DAILY
Qty: 90 TABLET | Refills: 2 | Status: SHIPPED | OUTPATIENT
Start: 2025-01-13

## 2025-01-13 RX ORDER — FAMOTIDINE 40 MG/1
40 TABLET, FILM COATED ORAL DAILY
Qty: 90 TABLET | Refills: 2 | Status: SHIPPED | OUTPATIENT
Start: 2025-01-13 | End: 2026-01-13

## 2025-06-17 ENCOUNTER — OFFICE VISIT (OUTPATIENT)
Dept: PRIMARY CARE | Facility: CLINIC | Age: 55
End: 2025-06-17
Payer: COMMERCIAL

## 2025-06-17 VITALS
SYSTOLIC BLOOD PRESSURE: 146 MMHG | DIASTOLIC BLOOD PRESSURE: 94 MMHG | BODY MASS INDEX: 27.44 KG/M2 | OXYGEN SATURATION: 98 % | TEMPERATURE: 98.6 F | HEIGHT: 71 IN | RESPIRATION RATE: 18 BRPM | WEIGHT: 196 LBS | HEART RATE: 94 BPM

## 2025-06-17 DIAGNOSIS — M79.10 MUSCLE PAIN: Primary | ICD-10-CM

## 2025-06-17 DIAGNOSIS — S76.311A STRAIN OF RIGHT HAMSTRING, INITIAL ENCOUNTER: ICD-10-CM

## 2025-06-17 PROCEDURE — 99214 OFFICE O/P EST MOD 30 MIN: CPT | Performed by: NURSE PRACTITIONER

## 2025-06-17 PROCEDURE — 1036F TOBACCO NON-USER: CPT | Performed by: NURSE PRACTITIONER

## 2025-06-17 PROCEDURE — 3008F BODY MASS INDEX DOCD: CPT | Performed by: NURSE PRACTITIONER

## 2025-06-17 RX ORDER — NABUMETONE 500 MG/1
500 TABLET, FILM COATED ORAL 2 TIMES DAILY
Qty: 60 TABLET | Refills: 1 | Status: SHIPPED | OUTPATIENT
Start: 2025-06-17 | End: 2026-06-17

## 2025-06-17 ASSESSMENT — PAIN SCALES - GENERAL: PAINLEVEL_OUTOF10: 4

## 2025-06-17 NOTE — PROGRESS NOTES
"Chief Complaint  Anthony Espinoza is a 55 y.o. male presenting for \"Leg Pain (DR ROOT PT- STATES HE HAS BEEN HAVING CRAMPING TO THE BACK OF RIGHT LEG. SEVERITY COMES AND GOES, BUT GET BAD WHERE HE CAN'T MOVE IT. PT DID TAKE ALEVE AND STATES THAT DID HELP A LITTLE. HAS BEEN GOING ON SINCE SATURDAY EVENING ).\"    HPI     Anthony Espinoza is a 55 y.o. male presenting for hamstring pain severity comes and goes, sitting helps has been so severe that it makes it hard to walk, hurts to bend forward and stretch.  Has not tried heat. Has had muscle tear in the        Past Medical History  Patient Active Problem List    Diagnosis Date Noted    Hyperlipidemia 08/01/2024        Medications  Current Outpatient Medications   Medication Instructions    atorvastatin (LIPITOR) 40 mg, oral, Daily    famotidine (PEPCID) 40 mg, oral, Daily    ibuprofen 800 mg, oral, Every 8 hours PRN        Surgical History  He has a past surgical history that includes Other surgical history (02/05/2018) and Appendectomy (02/05/2018).     Social History  He reports that he has never smoked. He has never been exposed to tobacco smoke. He has never used smokeless tobacco. He reports current alcohol use. He reports that he does not use drugs.    Family History  Family History[1]     Allergies  Patient has no known allergies.    ROS  Review of Systems     Last Recorded Vitals  BP (!) 146/94 (BP Location: Left arm, Patient Position: Sitting, BP Cuff Size: Adult)   Pulse 94   Temp 37 °C (98.6 °F)   Resp 18   Wt 88.9 kg (196 lb)   SpO2 98%     Physical Exam    Relevant Results      Assessment/Plan   There are no diagnoses linked to this encounter.        COUNSELING      Medication education:              Education:  The patient is counseled regarding potential side-effects of any and all new medications             Understanding:  Patient expressed understanding             Adherence:  No barriers to adherence identified        Dayanara Koo, APRN-CNP "              [1] No family history on file.

## 2025-07-20 DIAGNOSIS — Z76.0 MEDICATION REFILL: ICD-10-CM

## 2025-07-20 DIAGNOSIS — E78.5 HYPERLIPIDEMIA, UNSPECIFIED HYPERLIPIDEMIA TYPE: ICD-10-CM

## 2025-07-20 DIAGNOSIS — K21.9 CHRONIC GERD: ICD-10-CM

## 2025-07-21 RX ORDER — FAMOTIDINE 40 MG/1
40 TABLET, FILM COATED ORAL DAILY
Qty: 90 TABLET | Refills: 3 | Status: SHIPPED | OUTPATIENT
Start: 2025-07-21

## 2025-07-21 RX ORDER — ATORVASTATIN CALCIUM 40 MG/1
40 TABLET, FILM COATED ORAL DAILY
Qty: 90 TABLET | Refills: 3 | Status: SHIPPED | OUTPATIENT
Start: 2025-07-21

## 2025-07-21 NOTE — TELEPHONE ENCOUNTER
Refill request sent from pharmacy. Last OV  6/17/25. Last OB was 8/1/24 - need to schedule OV and can send rf to PCP to review once scheduled